# Patient Record
Sex: FEMALE | URBAN - METROPOLITAN AREA
[De-identification: names, ages, dates, MRNs, and addresses within clinical notes are randomized per-mention and may not be internally consistent; named-entity substitution may affect disease eponyms.]

---

## 2024-01-04 ENCOUNTER — OFFICE VISIT (OUTPATIENT)
Dept: PRIMARY CARE | Facility: CLINIC | Age: 34
End: 2024-01-04
Payer: COMMERCIAL

## 2024-01-04 VITALS
SYSTOLIC BLOOD PRESSURE: 117 MMHG | DIASTOLIC BLOOD PRESSURE: 87 MMHG | HEART RATE: 86 BPM | HEIGHT: 63 IN | OXYGEN SATURATION: 97 % | BODY MASS INDEX: 33.13 KG/M2 | WEIGHT: 187 LBS

## 2024-01-04 DIAGNOSIS — Z13.220 SCREENING FOR HYPERLIPIDEMIA: ICD-10-CM

## 2024-01-04 DIAGNOSIS — Z00.00 WELL ADULT EXAM: Primary | ICD-10-CM

## 2024-01-04 DIAGNOSIS — Z13.1 SCREENING FOR DIABETES MELLITUS: ICD-10-CM

## 2024-01-04 DIAGNOSIS — K64.9 HEMORRHOIDS, UNSPECIFIED HEMORRHOID TYPE: ICD-10-CM

## 2024-01-04 PROCEDURE — 1036F TOBACCO NON-USER: CPT | Performed by: FAMILY MEDICINE

## 2024-01-04 PROCEDURE — 99385 PREV VISIT NEW AGE 18-39: CPT | Performed by: FAMILY MEDICINE

## 2024-01-04 ASSESSMENT — PATIENT HEALTH QUESTIONNAIRE - PHQ9
1. LITTLE INTEREST OR PLEASURE IN DOING THINGS: NOT AT ALL
2. FEELING DOWN, DEPRESSED OR HOPELESS: NOT AT ALL
SUM OF ALL RESPONSES TO PHQ9 QUESTIONS 1 AND 2: 0

## 2024-01-04 NOTE — PROGRESS NOTES
"  Subjective   Patient ID: Jil East is a 33 y.o. female who presents for Annual Exam (New patient physical, no concerns today/Last pcp over 2 years ago in [place]).    Past Medical, Surgical, Social and Family Hx reviewed-Yes    Any acute complaints?    She has been wondering if she has hemorrhoids.  She has itching and some swelling but no bleeding.  No pain.  BMs daily, denies constipation    Any chronic issues addressed today or Rx refills needed?    No    Last pap about 4-5 years ago  Last Mammogram N/A  Colon CA screening Hx N/A  Labs never that she knows of  Up to date on immunizations Yes but unsure about TDaP  Dentist in the past year yes    Menstruation no concerns  Sexual Activity no concerns        PE:  /87   Pulse 86   Ht 1.594 m (5' 2.75\")   Wt 84.8 kg (187 lb)   LMP 12/23/2023   SpO2 97%   BMI 33.39 kg/m²   Alert adult woman, NAD  HEENT clear  Neck supple, No LAD  Heart RRR no murmur  Lungs CTA bilat  Abdomen benign, normal BS, soft NT/ND  Skin warm, dry, clear  Neuro grossly normal, gait WNL  Affect pleasant and appropriate, memory and judgement WNL        Assessment/Plan   Problem List Items Addressed This Visit    None  Visit Diagnoses         Codes    Well adult exam    -  Primary Z00.00    Screening for hyperlipidemia     Z13.220    Relevant Orders    Lipid Panel    Screening for diabetes mellitus     Z13.1    Relevant Orders    Hemoglobin A1C    Hemorrhoids, unspecified hemorrhoid type     K64.9          She is having very minimal hemorrhoid symptoms but ongoing for almost a year.  I suggest avoidance of toilet paper, use of warm water for cleansing after bowel movements, squatty potty, stool softener, and over-the-counter Preparation H for couple weeks.  If is not effective she should let me know so we can try other treatment options.    Reviewed health maintenance and vaccines.       "

## 2024-01-22 ENCOUNTER — APPOINTMENT (OUTPATIENT)
Dept: PRIMARY CARE | Facility: CLINIC | Age: 34
End: 2024-01-22
Payer: COMMERCIAL

## 2024-02-26 ENCOUNTER — LAB (OUTPATIENT)
Dept: LAB | Facility: LAB | Age: 34
End: 2024-02-26
Payer: COMMERCIAL

## 2024-02-26 DIAGNOSIS — Z13.220 SCREENING FOR HYPERLIPIDEMIA: ICD-10-CM

## 2024-02-26 DIAGNOSIS — Z13.1 SCREENING FOR DIABETES MELLITUS: ICD-10-CM

## 2024-02-26 LAB
CHOLEST SERPL-MCNC: 148 MG/DL (ref 0–199)
CHOLESTEROL/HDL RATIO: 2.8
EST. AVERAGE GLUCOSE BLD GHB EST-MCNC: 94 MG/DL
HBA1C MFR BLD: 4.9 %
HDLC SERPL-MCNC: 52.6 MG/DL
LDLC SERPL CALC-MCNC: 78 MG/DL
NON HDL CHOLESTEROL: 95 MG/DL (ref 0–149)
TRIGL SERPL-MCNC: 85 MG/DL (ref 0–149)
VLDL: 17 MG/DL (ref 0–40)

## 2024-02-26 PROCEDURE — 83036 HEMOGLOBIN GLYCOSYLATED A1C: CPT

## 2024-02-26 PROCEDURE — 80061 LIPID PANEL: CPT

## 2024-02-26 PROCEDURE — 36415 COLL VENOUS BLD VENIPUNCTURE: CPT

## 2024-05-09 ENCOUNTER — APPOINTMENT (OUTPATIENT)
Dept: DERMATOLOGY | Facility: CLINIC | Age: 34
End: 2024-05-09
Payer: COMMERCIAL

## 2024-07-01 ENCOUNTER — APPOINTMENT (OUTPATIENT)
Dept: OBSTETRICS AND GYNECOLOGY | Facility: CLINIC | Age: 34
End: 2024-07-01
Payer: COMMERCIAL

## 2024-07-01 VITALS
BODY MASS INDEX: 33.06 KG/M2 | HEIGHT: 63 IN | DIASTOLIC BLOOD PRESSURE: 72 MMHG | WEIGHT: 186.6 LBS | SYSTOLIC BLOOD PRESSURE: 120 MMHG

## 2024-07-01 DIAGNOSIS — Z12.4 CERVICAL CANCER SCREENING: ICD-10-CM

## 2024-07-01 DIAGNOSIS — Z31.41 FERTILITY TESTING: Primary | ICD-10-CM

## 2024-07-01 DIAGNOSIS — Z01.419 WELL WOMAN EXAM: ICD-10-CM

## 2024-07-01 PROCEDURE — 88175 CYTOPATH C/V AUTO FLUID REDO: CPT

## 2024-07-01 PROCEDURE — 87624 HPV HI-RISK TYP POOLED RSLT: CPT

## 2024-07-01 PROCEDURE — 1036F TOBACCO NON-USER: CPT | Performed by: OBSTETRICS & GYNECOLOGY

## 2024-07-01 PROCEDURE — 99385 PREV VISIT NEW AGE 18-39: CPT | Performed by: OBSTETRICS & GYNECOLOGY

## 2024-07-01 ASSESSMENT — ENCOUNTER SYMPTOMS
GASTROINTESTINAL NEGATIVE: 1
RESPIRATORY NEGATIVE: 1
NEUROLOGICAL NEGATIVE: 1
CONSTITUTIONAL NEGATIVE: 1
CARDIOVASCULAR NEGATIVE: 1
MUSCULOSKELETAL NEGATIVE: 1
PSYCHIATRIC NEGATIVE: 1

## 2024-07-01 NOTE — PROGRESS NOTES
"Lynn East is a 34 y.o. female who is here for a routine exam. Periods are regular every 25 days, lasting 5 days. Dysmenorrhea:none. Cyclic symptoms include none. No intermenstrual bleeding, spotting, or discharge. Denies dyspareunia.  She and her wife are interested in starting to try to conceive.    Current contraception:  same sex relationship  History of abnormal Pap smear: no  Family history of uterine or ovarian cancer: no  Regular self breast exam: yes  History of abnormal mammogram: no  Family history of breast cancer: no  History of abnormal lipids: no  Menstrual History:  OB History          0    Para   0    Term   0       0    AB   0    Living   0         SAB   0    IAB   0    Ectopic   0    Multiple   0    Live Births   0                  Patient's last menstrual period was 2024 (exact date).         Review of Systems   Constitutional: Negative.    Respiratory: Negative.     Cardiovascular: Negative.    Gastrointestinal: Negative.    Genitourinary: Negative.    Musculoskeletal: Negative.    Neurological: Negative.    Psychiatric/Behavioral: Negative.         Objective   /72   Ht 1.588 m (5' 2.5\")   Wt 84.6 kg (186 lb 9.6 oz)   LMP 2024 (Exact Date)   BMI 33.59 kg/m²     General:   alert, appears stated age, and cooperative   Heart: regular rate and rhythm, S1, S2 normal, no murmur, click, rub or gallop   Lungs: clear to auscultation bilaterally   Abdomen: soft, non-tender, without masses or organomegaly   Pelvic: Vulva normal in appearance without discoloration, rashes, lesions. Vagina is negative for blood, discharge, lesions. Uterus is small, mobile, non tender. There is no cervical motion tenderness, adnexal masses/tenderness   Breast: No masses, skin changes, discoloration, tenderness, or nipple drainage bilaterally     Neck: Normal ROM. Thyroid normal size. No masses/tenderness     Lymph: No LAD   Psych: Normal mood/affect     Assessment/Plan "   Problem List Items Addressed This Visit    None  Visit Diagnoses       Well woman exam    -  Primary    Cervical cancer screening        Relevant Orders    THINPREP PAP          1. Pelvic/breast exam wnl, counseled on breast awareness/self exam  2. Pap/cotest done.  3. Discussed plans for trying to conceive. [facility] baseline labs and referral given today. Discussed HSG prior to [facility] visit if planning IUI    RTO 1 year  Maryjane Heredia, DO

## 2024-07-01 NOTE — PATIENT INSTRUCTIONS
Routine Gynecologic Visit:  You were seen today for a routine gyn visit with normal findings on exam  You were due for a pap smear today. You should still continue to get annual breast and pelvic exams in the office.  Continue self breast exams/monitoring at home and call for appointment in the office if there are ever masses, skin discoloration, dimpling/puckering of the skin, or nipple drainage when you are not pregnant  We discussed plans for conception. Start prenatal vitamins prior to starting to try and conceive. Fertility labs and a referral to [facility] are ordered in the system.  If you are having any gynecologic issues in the next year you should call the office. [phone] ([place]) or [phone] ([place])

## 2024-11-26 ENCOUNTER — PATIENT MESSAGE (OUTPATIENT)
Dept: ENDOCRINOLOGY | Facility: CLINIC | Age: 34
End: 2024-11-26
Payer: COMMERCIAL

## 2024-11-26 ASSESSMENT — LIFESTYLE VARIABLES
HISTORY_ALCOHOL_USE: NO
HISTORY_ALCOHOL_USE: NO
TOBACCO_USE: NO
TOBACCO_USE: NO

## 2024-11-27 ENCOUNTER — CONSULT (OUTPATIENT)
Dept: ENDOCRINOLOGY | Facility: CLINIC | Age: 34
End: 2024-11-27
Payer: COMMERCIAL

## 2024-11-27 ENCOUNTER — ANCILLARY PROCEDURE (OUTPATIENT)
Dept: ENDOCRINOLOGY | Facility: CLINIC | Age: 34
End: 2024-11-27
Payer: COMMERCIAL

## 2024-11-27 VITALS
HEIGHT: 62 IN | SYSTOLIC BLOOD PRESSURE: 135 MMHG | DIASTOLIC BLOOD PRESSURE: 84 MMHG | WEIGHT: 192.38 LBS | HEART RATE: 78 BPM | BODY MASS INDEX: 35.4 KG/M2

## 2024-11-27 DIAGNOSIS — Z01.83 ENCOUNTER FOR RH BLOOD TYPING: ICD-10-CM

## 2024-11-27 DIAGNOSIS — Z11.59 ENCOUNTER FOR SCREENING FOR OTHER VIRAL DISEASES: ICD-10-CM

## 2024-11-27 DIAGNOSIS — Z01.812 ENCOUNTER FOR PREPROCEDURAL LABORATORY EXAMINATION: ICD-10-CM

## 2024-11-27 DIAGNOSIS — Z11.59 ENCOUNTER FOR SCREENING FOR OTHER VIRAL DISEASES: Primary | ICD-10-CM

## 2024-11-27 DIAGNOSIS — Z13.29 SCREENING FOR THYROID DISORDER: ICD-10-CM

## 2024-11-27 DIAGNOSIS — Z11.3 SCREENING FOR STDS (SEXUALLY TRANSMITTED DISEASES): ICD-10-CM

## 2024-11-27 DIAGNOSIS — Z31.41 FERTILITY TESTING: ICD-10-CM

## 2024-11-27 LAB
ABO GROUP (TYPE) IN BLOOD: NORMAL
ANTIBODY SCREEN: NORMAL
CMV IGG AVIDITY SERPL IA-RTO: NONREACTIVE %
HBV SURFACE AG SERPL QL IA: NONREACTIVE
HCV AB SER QL: NONREACTIVE
HIV 1+2 AB+HIV1 P24 AG SERPL QL IA: NONREACTIVE
RH FACTOR (ANTIGEN D): NORMAL
RUBV IGG SERPL IA-ACNC: 1.3 IA
RUBV IGG SERPL QL IA: POSITIVE
TREPONEMA PALLIDUM IGG+IGM AB [PRESENCE] IN SERUM OR PLASMA BY IMMUNOASSAY: NONREACTIVE
TSH SERPL-ACNC: 3.25 MIU/L (ref 0.44–3.98)
VARICELLA ZOSTER IGG INDEX: 2.8 IA
VZV IGG SER QL IA: POSITIVE

## 2024-11-27 PROCEDURE — 86317 IMMUNOASSAY INFECTIOUS AGENT: CPT

## 2024-11-27 PROCEDURE — 86644 CMV ANTIBODY: CPT

## 2024-11-27 PROCEDURE — 87389 HIV-1 AG W/HIV-1&-2 AB AG IA: CPT

## 2024-11-27 PROCEDURE — 87340 HEPATITIS B SURFACE AG IA: CPT

## 2024-11-27 PROCEDURE — 86850 RBC ANTIBODY SCREEN: CPT

## 2024-11-27 PROCEDURE — 86787 VARICELLA-ZOSTER ANTIBODY: CPT

## 2024-11-27 PROCEDURE — 86803 HEPATITIS C AB TEST: CPT

## 2024-11-27 PROCEDURE — 84443 ASSAY THYROID STIM HORMONE: CPT

## 2024-11-27 PROCEDURE — 87591 N.GONORRHOEAE DNA AMP PROB: CPT

## 2024-11-27 PROCEDURE — 86645 CMV ANTIBODY IGM: CPT

## 2024-11-27 PROCEDURE — 83516 IMMUNOASSAY NONANTIBODY: CPT

## 2024-11-27 PROCEDURE — 87491 CHLMYD TRACH DNA AMP PROBE: CPT

## 2024-11-27 PROCEDURE — 99204 OFFICE O/P NEW MOD 45 MIN: CPT | Performed by: NURSE PRACTITIONER

## 2024-11-27 PROCEDURE — 86780 TREPONEMA PALLIDUM: CPT

## 2024-11-27 PROCEDURE — 86900 BLOOD TYPING SEROLOGIC ABO: CPT

## 2024-11-27 PROCEDURE — 86901 BLOOD TYPING SEROLOGIC RH(D): CPT

## 2024-11-27 PROCEDURE — 99214 OFFICE O/P EST MOD 30 MIN: CPT | Performed by: NURSE PRACTITIONER

## 2024-11-27 ASSESSMENT — PATIENT HEALTH QUESTIONNAIRE - PHQ9
SUM OF ALL RESPONSES TO PHQ9 QUESTIONS 1 AND 2: 0
2. FEELING DOWN, DEPRESSED OR HOPELESS: NOT AT ALL
1. LITTLE INTEREST OR PLEASURE IN DOING THINGS: NOT AT ALL

## 2024-11-27 ASSESSMENT — COLUMBIA-SUICIDE SEVERITY RATING SCALE - C-SSRS
1. IN THE PAST MONTH, HAVE YOU WISHED YOU WERE DEAD OR WISHED YOU COULD GO TO SLEEP AND NOT WAKE UP?: NO
2. HAVE YOU ACTUALLY HAD ANY THOUGHTS OF KILLING YOURSELF?: NO
6. HAVE YOU EVER DONE ANYTHING, STARTED TO DO ANYTHING, OR PREPARED TO DO ANYTHING TO END YOUR LIFE?: NO

## 2024-11-27 ASSESSMENT — PAIN SCALES - GENERAL: PAINLEVEL_OUTOF10: 0-NO PAIN

## 2024-11-27 NOTE — PROGRESS NOTES
Visit Type: In Person    NEW FERTILITY PATIENT VISIT    Referred by: Dr. Maryjane Heredia    Accompanied today by: Valerie (spouse)       Jil East is a 34 y.o.  female who presents with   Same sex couple fertility    They are wanting to conceive 1 child with donor sperm. Wanting to do an anonymous donor and try IUI first.     Have you had any concerns about your fertility treatments so far? No     What are you goals for today's visit? Beginning fertility journey, same sex couple     What causes of infertility have been identified on your workup so far? N/A     Past Infertility Treatments: No      Please summarize your fertility treatments to date.   No    As far as you are aware, do you have insurance coverage for fertility diagnostic testing and/or fertility treatments? Yes      PRIOR EVALUATION / TREATMENT  No  Hysterosalpingogram: NA  Saline Infused Sonography: NA  GYN Pelvic Ultrasound: NA  Other:  NA  Prior Labs  Lab Results    Date Done      AMH: No results found for requested labs within last 1825 days. No results found for requested labs within last 1825 days.   TSH: No results found for requested labs within last 1825 days. No results found for requested labs within last 1825 days.   PRL: No results found for requested labs within last 1825 days. No results found for requested labs within last 1825 days.   Testosterone: No results found for requested labs within last 1825 days. No results found for requested labs within last 1825 days.   DHEAS: No results found for requested labs within last 1825 days. No results found for requested labs within last 1825 days.   FSH: No results found for requested labs within last 1825 days. No results found for requested labs within last 1825 days.   17 OHP: No results found for requested labs within last 1825 days. No results found for requested labs within last 1825 days.   HgbA1c: 4.9 (Ref range: see below %) 2024   Hepatitis B surface antigen: No  results found for requested labs within last 1825 days. No results found for requested labs within last 1825 days.   Hepatitis C antibody: No results found for requested labs within last 1825 days. No results found for requested labs within last 1825 days.   HIV ½ Antigen Antibody screen with reflex: No results found for requested labs within last 1825 days. No results found for requested labs within last 1825 days.   Syphilis screening with reflex: No results found for requested labs within last 1825 days. No results found for requested labs within last 1825 days.   GC: No results found for requested labs within last 1825 days. No results found for requested labs within last 1825 days.   CT: No results found for requested labs within last 1825 days. No results found for requested labs within last 1825 days.   Type and Screen: No results found for requested labs within last 1825 days. No results found for requested labs within last 1825 days.   Rh: No results found for requested labs within last 1825 days. No results found for requested labs within last 1825 days.   Antibody: No results found for requested labs within last 1825 days. No results found for requested labs within last 1825 days.   Rubella: No results found for requested labs within last 1825 days. No results found for requested labs within last 1825 days.   Varicella: No results found for requested labs within last 1825 days. No results found for requested labs within last 1825 days.   Hemoglobin: No results found for requested labs within last 1825 days. No results found for requested labs within last 1825 days.   Hematocrit: No results found for requested labs within last 1825 days. No results found for requested labs within last 1825 days.   Creatinine: No results found for requested labs within last 1825 days. No results found for requested labs within last 1825 days.   AST:No results found for requested labs within last 1825 days. No results found  for requested labs within last 1825 days.   ALT:No results found for requested labs within last 1825 days.: No results found for requested labs within last 1825 days.   Relationship Status:       Have you ever been pregnant? No    How many times have you been pregnant?  0  Have you ever had a miscarriage? No    How many times have you had a miscarriage?  0     OB Hx     OB History          0    Para   0    Term   0       0    AB   0    Living   0         SAB   0    IAB   0    Ectopic   0    Multiple   0    Live Births   0                 GYN HISTORY   Have you ever been diagnosed with a sexually transmitted disease? No    Please select all that are applicable:    Have you ever had Pelvic Inflammatory Disease? No    Have you had an abnormal PAP smear? No    Date & Result of last PAP smear:   Lab Results   Component Value Date    FINALINTERP  2024         A. THINPREP PAP CERVIX, SCREENING -     Specimen Adequacy  Satisfactory for evaluation; endocervical/transformation zone component is present    General Categorization  Negative for intraepithelial lesion or malignancy.    Descriptive Interpretation  Negative for intraepithelial lesion or malignancy              Have you ever had an abnormal Mammogram? No    Date & result of your last mammogram:  NA  Do you have pelvic pain? No    How many times per week do you have intercourse?    Do you have pain with intercourse? No    Do you use lubricants with intercourse?    Do you have pain with bowel movements? No              Do you have pain with a full bladder? No    MENSTRUAL HISTORY  LMP:    Menarche: 2024    Contraception: N/A    Cycle length: 30    Describe your bleeding: Average    Dysmenorrhea: No       ENDOCRINE/INFERTILITY HISTORY  Duration of infertility: Not applicable    Coital Activity/week:    Nipple Discharge: No    Vision changes: No    Headaches: No    Excess hair growth: No    Excessive hair loss: No    Acne: No    Oily  skin: No    Recent weight change  Weight gain: No    Weight loss: No    Exercise more than 3 times a week: Yes      PMH  Past Medical History:   Diagnosis Date    No pertinent past medical history         MEDICATIONS  No current outpatient medications on file prior to visit.     No current facility-administered medications on file prior to visit.        PSH  Past Surgical History:   Procedure Laterality Date    APPENDECTOMY      TONSILLECTOMY          PSYCH HISTORY  Have you ever been diagnosed with a mental health Issue? No    Have you ever been hospitalized for a mental health disorder? No       SOCIAL HISTORY  Social History     Tobacco Use    Smoking status: Never     Passive exposure: Never    Smokeless tobacco: Never   Vaping Use    Vaping status: Never Used   Substance Use Topics    Alcohol use: Not Currently     Alcohol/week: 1.0 standard drink of alcohol     Types: 1 Standard drinks or equivalent per week    Drug use: Not Currently     Occupation:  at [employer]    Have you ever been incarcerated? No    Do you have a history of domestic violence? No    Do you feel safe at home? Yes    Do you have a history of any negative sexual experience such as incest or rape? No       PARTNER HISTORY  Partner Name: Valerie Quirozulysses    Partner : [id number]    Partner email: [email or web address]    Occupation: Ticketing Director at [employer]    Prior fertility history:    PMH:    PSH:    Smoking:No    Alcohol Use: No    Drug Use: No    Medications:    Injuries: No    STD: No    Please select all that are applicable:    SA: No    SA Results:      FAMILY HISTORY  Family History   Problem Relation Name Age of Onset    No Known Problems Mother      Other (htn) Father      Stroke Father      Prostate cancer Father  80    No Known Problems Sister         CANCER HISTORY    Breast: No    Ovarian: No    Colon: No    Endometrial: No      FAMILY VTE HISTORY  Family History of Blood Clots: No      GENETIC  "HISTORY  Ethnic Background  Patient:     Partner:     Genetic Disease in Family  Patient: No    Partner: No    Birth Defects in Family  Patient: No    Partner: No    Genetic screening performed previously:   None     BMI:   BMI Readings from Last 1 Encounters:   24 35.19 kg/m²     VITALS:  /84   Pulse 78   Ht 1.575 m (5' 2\")   Wt 87.3 kg (192 lb 6 oz)   LMP 2024   BMI 35.19 kg/m²     ASSESSMENT   34 y.o.  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  Partner SA: NA    COUNSELING  We discussed causes of infertility including hormonal, egg quality issues, structural problems such as endometriosis, adhesions, or tubal problems, uterine factors such as polyps or fibroids, and sperm issues. Reviewed evaluation of such as well. We discussed various methods for achieving pregnancy in some detail including, ovulation induction, insemination, superovulation and IVF.    We discussed the options for donor sperm, including using a directed vs. unidentified donor; we discussed the pros and cons of each option.  We discussed that for unidentified donors that offspring may be able to identify the donor and/or other offspring from the same donor in the future. We discussed the costs and logistics of obtaining the sperm and the use of a sperm bank.  We discussed options for conceiving including natural cycle IUI, medicated IUI, and IVF.  We discussed natural fecundity and the need for fertility testing.  We discussed the Fertility Center policy to obtain donor clearance prior to ordering and shipping sperm. Donor sperm must be on site prior to treatment cycle start.    DONOR SPERM FACILITIES*     For your convenience, below is a list of commonly used donor sperm facilities by patients at  Fertility Center.   The facilities listed below adhere to CLIA guidelines and are FDA approved for .     [place] " [facility] ([facility])   [facility] ([facility])   [facility]   [facility]   [facility]   [facility]   [facility]   [facility]     If the facility you wish to use is not listed, you may request a review by the  and third party manager. Approval is not guaranteed and is granted on a case-by-case basis.   The donor of your choice will be reviewed by our third party team. Upon approval, please confirm with your clinical team before shipping. Samples and/or donors that have not been approved may not be accepted.        * Please note that [facility] has no affiliation or agency relationship with any of the donor facilities listed above, and it disclaims any and all responsibility for tissue or other property transferred from such storage facilities.     INSTRUCTIONS FOR INFERTILITY TESTING    Semen Analysis  Call cd 1 to schedule both HSG and pelvic ult      Hysterosalpingogram (HSG or x-ray dye test)  An HSG is a test used to make sure your fallopian tubes are not blocked.  This test can only be done between cycle days 5-11, so it is important for you to call as soon as your period starts to schedule an appointment.  You should take ibuprofen 30 minutes before your appointment as this test can cause transient cramping.      If you are allergic to iodine or shellfish, please inform the . Please call [phone] to schedule this appointment.      PELVIC ULTRASOUNDS  Please call [phone] to schedule this appointment.      Continue to take a prenatal vitamin daily (800mcg or 0.8mg folic acid)    Please reach out to the office for a plan once all the testing is complete- I am not notified when everything on this list is complete, so please call the office directly to check in. The office number is [phone].    If you are interested in integrative medicine to support your care, please contact   "[facility] at [phone]. Services available include acupuncture, integrative medicine consultations, massage, meditation and stress management. For acupuncture specifically, please ask for Suzy Segovia (east side) or Ivonne Campos (west side).     We also offer a virtual support group every Monday from 6pm-7pm. For more info please email:    For more information and to RSVP, email [email or web address]       If you have any questions and wish to review with a member of our team, please do CALL THE OFFICE during business hours. The office number is [phone].     'MONITORED CYCLE  You will take your oral fertility medications as prescribed cycle days 3-7. When you start the medications, you can also call the office to get set up for a \"follicle scan.\" Follicle scans are usually done between cycle days 10-12. It is a vaginal ultrasound. We typically do not perform follicle scans on the weekends, so the schedulers will help you find an appropriate appointment time. The follicle scans are done in the morning and will give us information on how your eggs are growing and how thick the lining of the uterus is. Once the follicles (eggs) are big enough (ripe or mature) we will trigger their release with a one time subcutaneous injection- the nurses will order this for you and show you how to do it when you are here for the follicle scan. We cannot trigger small eggs, only ones that have grown to mature size. The IUI is usually done about 36 hours after the trigger. The nurse will also help you schedule that.    Routine Testing  Fertility Center  STDs Within 1 year   Genetic carrier Waiver/Completed   T&S Within 1 year   AMH Within 1 year   TSH Within 1 year   Rubella/Varicella Within 5 years     PLAN  Orders Placed This Encounter   Procedures    Hysterosalpingogram (HSG)    US pelvis transvaginal    FL hysterosalpingogram    Cytomegalovirus Antibody, IgM    Cytomegalovirus " Antibody, Igg    Antimullerian Hormone (Amh)    TSH with reflex to Free T4 if abnormal    Type And Screen    Rubella Antibody, Igg    Varicella Zoster Antibody, Igg    Hepatitis B surface antigen    Hepatitis C Antibody    HIV 1/2 Antigen/Antibody Screen with Reflex to Confirmation    Syphilis Screen with Reflex    C. trachomatis / N. gonorrhoeae, Amplified    POCT pregnancy, urine manually resulted       GENETIC SCREENING PATIENT  NA    PARTNER  NA  NA    FOLLOW UP   Consults: Psych consult: [email or web address] party- [phone] , genetics- 3rd party- [phone]  Chart to primary nurse for care coordination and patient check list/education  Enroll in Engaged MD  Take prenatal vitamins, vitamin D 2000 IUs daily  Discussed that pap and mammogram must be updated per ACOG guidelines before treatment can begin  Discussed that treatment cannot proceed until checklist items are complete   6 week follow up with [facility]  Additional testing for BMI < 18 or > 40: No      MD Completion:  Ectopic Risk: No  Medically Complex: No    Fertility Plan Update:  Natural/monitored/trigger/donor IUIx3    Jessica Mayfield  11/27/2024  10:10 AM

## 2024-11-28 LAB
C TRACH RRNA SPEC QL NAA+PROBE: NEGATIVE
N GONORRHOEA DNA SPEC QL PROBE+SIG AMP: NEGATIVE

## 2024-11-30 LAB — CMV IGM SERPL-ACNC: <8 AU/ML

## 2024-12-01 LAB — MIS SERPL-MCNC: 3.18 NG/ML (ref 0.18–11.71)

## 2025-01-17 ENCOUNTER — ANCILLARY PROCEDURE (OUTPATIENT)
Dept: ENDOCRINOLOGY | Facility: CLINIC | Age: 35
End: 2025-01-17
Payer: COMMERCIAL

## 2025-01-17 DIAGNOSIS — Z31.41 FERTILITY TESTING: ICD-10-CM

## 2025-01-17 PROCEDURE — 76830 TRANSVAGINAL US NON-OB: CPT | Performed by: STUDENT IN AN ORGANIZED HEALTH CARE EDUCATION/TRAINING PROGRAM

## 2025-01-17 PROCEDURE — 76830 TRANSVAGINAL US NON-OB: CPT

## 2025-02-05 ENCOUNTER — HOSPITAL ENCOUNTER (OUTPATIENT)
Dept: RADIOLOGY | Facility: HOSPITAL | Age: 35
Discharge: HOME | End: 2025-02-05
Payer: COMMERCIAL

## 2025-02-05 ENCOUNTER — ANCILLARY PROCEDURE (OUTPATIENT)
Dept: ENDOCRINOLOGY | Facility: CLINIC | Age: 35
End: 2025-02-05
Payer: COMMERCIAL

## 2025-02-05 DIAGNOSIS — Z01.812 ENCOUNTER FOR PREPROCEDURAL LABORATORY EXAMINATION: ICD-10-CM

## 2025-02-05 DIAGNOSIS — Z31.41 FERTILITY TESTING: ICD-10-CM

## 2025-02-05 LAB — PREGNANCY TEST URINE, POC: NEGATIVE

## 2025-02-05 PROCEDURE — 2550000001 HC RX 255 CONTRASTS: Performed by: NURSE PRACTITIONER

## 2025-02-05 PROCEDURE — 58340 CATHETER FOR HYSTEROGRAPHY: CPT

## 2025-02-05 PROCEDURE — 58340 CATHETER FOR HYSTEROGRAPHY: CPT | Performed by: NURSE PRACTITIONER

## 2025-02-05 RX ADMIN — IOHEXOL 20 ML: 240 INJECTION, SOLUTION INTRATHECAL; INTRAVASCULAR; INTRAVENOUS; ORAL at 08:37

## 2025-02-05 NOTE — PROCEDURES
Hysterosalpingogram (HSG)    Date/Time: 2/5/2025 8:38 AM    Performed by: SUNIL Quiros  Authorized by: SUNIL Quiros    Consent:     Consent obtained:  Written and verbal    Consent given by:  Patient    Risks, benefits, and alternatives were discussed: yes      Risks discussed:  Bleeding, infection and pain    Alternatives discussed:  No treatment  Universal protocol:     Procedure explained and questions answered to patient or proxy's satisfaction: yes      Relevant documents present and verified: yes      Test results available: yes      Imaging studies available: yes      Required blood products, implants, devices, and special equipment available: yes      Site/side marked: no      Immediately prior to procedure, a time out was called: yes      Patient identity confirmed:  Verbally with patient, arm band and hospital-assigned identification number  Indications:     Indications:  Fertility testing  Pre-procedure details:     Skin preparation:  Povidone-iodine  Sedation:     Sedation type:  None  Anesthesia:     Anesthesia method:  None  Procedure specific details:      Done by this [facility]      Hysterosalpingogram (HSG) risks, benefits, alternatives, and personnel discussed with patient who agreed to proceed.    Procedural time out        Done in room where procedure done: Yes        Done just before starting procedure: Yes                                                 All members of procedural team involved in time-out: Yes                  Active communication used: Yes                                                         All team members agreed on procedure: Yes                                        Patient correctly identified by two identifiers: Yes                                  Correct side and site identified: Yes                                                     All needed special equipment/instruments available: Yes               Prior to the start of the procedure a time out  was taken and the following were verified: The identity of the patient using two patient identifiers.   Urine pregnancy test was performed and was negative.   Risks, benefits, and alternatives of the procedure were explained to the patient.  Informed consent was obtained.     The patient was placed in the dorsal lithotomy position and a sterile speculum was placed in the vagina. The cervix was sterilized with Betadine x 3. The anterior lip of the cervix was grasped with a single-tooth tenaculum. The (balloon/acorn) cannula was then placed in the cervix and secured to the tenaculum. The patient was positioned and images were taken with fluoroscopy as dye was inserted through the cannula. All instruments were then removed. The patient tolerated the procedure well and was discharged home the same day without complications.    Uterus:  normal contour without filling defects  Tubes:  bilateral patency with free spill of dye, normal tubal architecture noted, and no loculations present.  Based on these findings, my recommendation is: No further follow up required.    The patient was counseled regarding the above findings and images were reviewed with patient at the time of the exam.     Jessica Mayfield 02/05/25 8:38 AM        Post-procedure details:     Procedure completion:  Tolerated well, no immediate complications

## 2025-02-07 ENCOUNTER — APPOINTMENT (OUTPATIENT)
Dept: GENETICS | Facility: CLINIC | Age: 35
End: 2025-02-07
Payer: COMMERCIAL

## 2025-02-07 VITALS
SYSTOLIC BLOOD PRESSURE: 126 MMHG | HEIGHT: 62 IN | BODY MASS INDEX: 35.7 KG/M2 | HEART RATE: 64 BPM | DIASTOLIC BLOOD PRESSURE: 86 MMHG | WEIGHT: 194 LBS

## 2025-02-07 DIAGNOSIS — Z31.430 ENCOUNTER OF FEMALE FOR TESTING FOR GENETIC DISEASE CARRIER STATUS FOR PROCREATIVE MANAGEMENT: ICD-10-CM

## 2025-02-07 DIAGNOSIS — Z31.69 ENCOUNTER FOR PRECONCEPTION CONSULTATION: ICD-10-CM

## 2025-02-07 PROCEDURE — 96041 GENETIC COUNSELING SVC EA 30: CPT | Performed by: GENETIC COUNSELOR, MS

## 2025-02-07 ASSESSMENT — PAIN SCALES - GENERAL: PAINLEVEL_OUTOF10: 0-NO PAIN

## 2025-02-07 NOTE — PROGRESS NOTES
Thank you for the referral of Jil East.  She is a 34 year old, G0, female seen for preconception genetic counseling with her wife, Valerie, to discuss carrier screening in the context of selecting a sperm donor.         PAST HISTORY:  Jil and Valerie are interested in conception; Jil would like to undergo intrauterine insemination with donor sperm. The couple does not intend to have more than one child, and do not plan on future conceptions utilizing Valerie's oocytes.     FAMILY HISTORY  Medical and family histories were reviewed and no significant concerns reported.   The family history was negative for birth defects, intellectual disability, recurrent pregnancy loss, or recognized inherited conditions.  Consanguinity denied.          SELF REPORTED RACE/ANCESTRY:  Patient: Divehi, Bel[place]an, Portuguese, Northern Irish, Some Ashkenazi Lutheran      COUNSELING:    The following information was discussed with your patient:    1. When utilizing an anonymous donor for conception, intended parents must use an approved bank for donor selection after review of donor profiles. Per [facility] Policy any donor must be approved prior to sperm being purchased and utilized for conception. Included in the donor profile(s) is any genetic screening that the donor may have had. The most common form of screening is expanded carrier screening, in which the donor is screened for a number of autosomal recessive and/or X-linked genetic conditions and determined to be a carrier or unlikely to be a carrier of each disorder tested. The specific carrier testing panel (number and types of diseases included) is variable from donor to donor, even when donors are from the same bank. If both gamete sources are carriers of the same autosomal recessive condition, there would be a 25% chance for each conception to be affected with the disorder. In this event, selecting an alternative donor, or performing preimplantation genetic testing for the disorder of concern  may be considered.     2. We discussed the option of pursuing carrier screening for ACMG recommended conditions (113 disease panel) to screen for the most common diseases (those with a carrier frequency of 1% or more in the general population) with the ability to reflex to additional diseases at no cost if needed. When testing is performed prior to reviewing donor profiles this allows the patient to be aware of any common diseases they may be a carrier of and attempt to select a donor that has had negative screening for said disease(s). In addition, they also have more flexibility in selecting a donor that may be a carrier of a common disease if they are already aware that they have screened negative for said disease.  If a donor is a carrier of a condition that the patient has not been screened for, additional screening can be performed on the patient if desired; however, if the patient is a carrier of a condition that the potential donor has not been screened for, additional screening for the donor cannot be guaranteed. If further testing is not performed, the couple can select an alternate donor, or they can proceed with the selected donor once they have been informed of the potential risk of an affected child in the absence of additional carrier screening. Approximately 2-4% of reproductive couples are found to be at risk to have a child with a genetic disorder based on expanded carrier screening. Carriers generally do not have symptoms. In rare cases, expanded carrier screening results may have health implications for the tested individual.  If the patient does not want to proceed with carrier screening at this time, carrier screening can be declined altogether, or it can be deferred and testing can be performed in the context of screening for any diseases that may be of concern for any potential donor. In addition, more comprehensive carrier screening options are available and were also discussed. If testing  is elected, we also discussed consideration of screening BOTH female partners if they plan on future conceptions utilizing the same sperm donor with each of their oocytes (declined).     3. We discussed that expanded carrier screening panels are typically screening for primarily autosomal recessive conditions, and potentially some X-linked conditions with childhood onset of symptoms. The types of conditions screened for range in severity, clinical symptoms, age of onset, management, and whether treatment is available. In addition, the number of conditions and specific conditions tested for on any given panel is not standardized, and genetic screening ordered for gamete donors is not consistent across bacon. They are often not screened for other types of hereditary disease, such as predisposition to cancers, heart diseases, neurological problems, etc. They sometimes have screening of their chromosomes, which can assist in understanding risks of chromosome conditions in a conception from that donor. Any genetic testing that is or is not completed through a bank can be discussed with a genetic counselor once a donor is selected.       4. If patient proceeds with carrier screening, once results are issued and other recommendations by their CAMILLE provider are completed, CAMILLE team provides clearance to begin searching for a donor. If patient's carrier screening is negative, they would need to ensure that they have screened negative for any disease(s) that a selected donor may have screened positive for. If carrier screening is positive, they would additionally need to review that the donor has screened negative for any disease the patient has tested positive for. We recommend additional genetic counseling appointment to review the patient and donor's test results for clearance.            DISPOSITION:  The patient stated that she understood the above information and elected to proceed with:  -Carrier screening via   disease/113 gene panel; drawn today and sent to [facility]      Total time Carole Vásquez MS, [facility] spent on day of encounter: 56 minutes (45 minutes with patient, 11 minutes on pre/post patient care activities, including documentation).    Thank you for allowing us to participate in the care of your patient.  Should you or your patient have any questions, please do not hesitate to contact our office at [phone].      Sincerely,      Carole Vásquez MS  Licensed Genetic Counselor

## 2025-02-11 ENCOUNTER — TELEMEDICINE (OUTPATIENT)
Dept: ENDOCRINOLOGY | Facility: CLINIC | Age: 35
End: 2025-02-11
Payer: COMMERCIAL

## 2025-02-11 VITALS — WEIGHT: 190 LBS | HEIGHT: 62 IN | BODY MASS INDEX: 34.96 KG/M2

## 2025-02-11 DIAGNOSIS — Z31.89 ENCOUNTER FOR FERTILITY PLANNING: Primary | ICD-10-CM

## 2025-02-11 PROCEDURE — 1036F TOBACCO NON-USER: CPT | Performed by: NURSE PRACTITIONER

## 2025-02-11 PROCEDURE — 3008F BODY MASS INDEX DOCD: CPT | Performed by: NURSE PRACTITIONER

## 2025-02-11 PROCEDURE — 99214 OFFICE O/P EST MOD 30 MIN: CPT | Performed by: NURSE PRACTITIONER

## 2025-02-11 ASSESSMENT — PAIN SCALES - GENERAL: PAINLEVEL_OUTOF10: 0-NO PAIN

## 2025-02-11 ASSESSMENT — PATIENT HEALTH QUESTIONNAIRE - PHQ9
1. LITTLE INTEREST OR PLEASURE IN DOING THINGS: NOT AT ALL
SUM OF ALL RESPONSES TO PHQ9 QUESTIONS 1 AND 2: 0
2. FEELING DOWN, DEPRESSED OR HOPELESS: NOT AT ALL

## 2025-02-11 ASSESSMENT — PROMIS GLOBAL HEALTH SCALE
RATE_QUALITY_OF_LIFE: EXCELLENT
RATE_PHYSICAL_HEALTH: VERY GOOD
RATE_GENERAL_HEALTH: VERY GOOD
RATE_AVERAGE_PAIN: 0
EMOTIONAL_PROBLEMS: NEVER
RATE_SOCIAL_SATISFACTION: EXCELLENT
CARRYOUT_SOCIAL_ACTIVITIES: EXCELLENT
RATE_MENTAL_HEALTH: EXCELLENT
CARRYOUT_PHYSICAL_ACTIVITIES: COMPLETELY

## 2025-02-11 NOTE — PROGRESS NOTES
"  Virtual or Telephone Consent: An interactive audio and video telecommunication system which permits real time communications between the patient (at the originating site) and provider (at the distant site) was utilized to provide this telehealth service  MD reviewed, Authorization status not noted.    Follow Up Visit HPI    Patient is a 34 y.o.  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     Testing to date:   Result Date Done   TSH: 3.25 (Ref range: 0.44 - 3.98 mIU/L) 2024   AMH: 3.179 (Ref range: 0.176 - 11.705 ng/mL) 2024   PRL: No results found for requested labs within last 365 days. No results found for requested labs within last 365 days.   Testosterone: No results found for requested labs within last 365 days. No results found for requested labs within last 365 days.   DHEAS: No results found for requested labs within last 365 days. No results found for requested labs within last 365 days.   Other:     Hysterosalpingogram: FL HYSTEROSALPINGOGRAM (2025):   Normal    Saline Infused Sonography: NA  GYN Pelvic Ultrasound: US PELVIS TRANSVAGINAL (2025):   Normal    Partner SA: NA    Treatment to date:   None    Past Medical History:   Diagnosis Date    No pertinent past medical history      Past Surgical History:   Procedure Laterality Date    APPENDECTOMY  2003    TONSILLECTOMY       No current outpatient medications on file prior to visit.     No current facility-administered medications on file prior to visit.       BMI:   BMI Readings from Last 1 Encounters:   25 34.75 kg/m²     VITALS:  Ht 1.575 m (5' 2\")   Wt 86.2 kg (190 lb)   LMP 2025 (Exact Date)   BMI 34.75 kg/m²   LMP: Patient's last menstrual period was 2025 (exact date).    ASSESSMENT   34 y.o.  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    COUNSELING    MONITORED " "CYCLE  You will take your oral fertility medications as prescribed cycle days 3-7. When you start the medications, you can also call the office to get set up for a \"follicle scan.\" Follicle scans are usually done between cycle days 10-12. It is a vaginal ultrasound. We typically do not perform follicle scans on the weekends, so the schedulers will help you find an appropriate appointment time. The follicle scans are done in the morning and will give us information on how your eggs are growing and how thick the lining of the uterus is. Once the follicles (eggs) are big enough (ripe or mature) we will trigger their release with a one time subcutaneous injection- the nurses will order this for you and show you how to do it when you are here for the follicle scan. We cannot trigger small eggs, only ones that have grown to mature size. The IUI is usually done about 36 hours after the trigger. The nurse will also help you schedule that.      Routine Testing  Fertility Center  STDs Within 1 year   Genetic carrier Waiver/Completed   T&S Within 1 year   AMH Within 1 year   TSH Within 1 year   Rubella/Varicella Within 5 years     BMI Testing  Fertility Center  CBC Within 1 year   CMP Within 1 year   HgbA1c Within 1 year   Mag, Phos, Vit D <18 Within 1 year   MFM > 40  REQ   Wt loss consult > 40 OPT     PLAN  No orders of the defined types were placed in this encounter.      FOLLOW UP   Consults:  continue with psych consult as scheduled on 3/7 and follow up with Yajaira on donor choice after testing done.  Engaged MD  Take prenatal vitamins, vitamin D 2000 IUs daily  Discussed that treatment cannot proceed until checklist items are complete.   Additional testing for BMI < 18 or > 40: No.  Patient to schedule follow up visit if not pregnant after 3 IUI's. Advised patient to arrange this now with the .  Chart to primary nurse for care coordination and patient check list/education.    MD Completion:  Ectopic Risk: " No  Medically Complex: No  Outstanding boarding pass items: psych and donor clearance    Fertility Plan Update:  Natural cycle/monitoring/trigger/donor IUI x 3    Sent message to RN to set up boarding pass- does not need another visit unless patient wants one.    Intimate Exam Performed: No, an intimate exam was not performed at this encounter.     Jessica Mayfield  02/11/2025  3:06 PM

## 2025-02-12 ENCOUNTER — APPOINTMENT (OUTPATIENT)
Dept: PRIMARY CARE | Facility: CLINIC | Age: 35
End: 2025-02-12
Payer: COMMERCIAL

## 2025-02-12 VITALS
BODY MASS INDEX: 35.15 KG/M2 | HEART RATE: 68 BPM | OXYGEN SATURATION: 98 % | WEIGHT: 191 LBS | DIASTOLIC BLOOD PRESSURE: 88 MMHG | SYSTOLIC BLOOD PRESSURE: 130 MMHG | HEIGHT: 62 IN

## 2025-02-12 DIAGNOSIS — Z00.00 WELL ADULT EXAM: Primary | ICD-10-CM

## 2025-02-12 PROCEDURE — 99395 PREV VISIT EST AGE 18-39: CPT | Performed by: FAMILY MEDICINE

## 2025-02-12 PROCEDURE — 3008F BODY MASS INDEX DOCD: CPT | Performed by: FAMILY MEDICINE

## 2025-02-12 PROCEDURE — 1036F TOBACCO NON-USER: CPT | Performed by: FAMILY MEDICINE

## 2025-02-12 PROCEDURE — 90715 TDAP VACCINE 7 YRS/> IM: CPT | Performed by: FAMILY MEDICINE

## 2025-02-12 PROCEDURE — 90471 IMMUNIZATION ADMIN: CPT | Performed by: FAMILY MEDICINE

## 2025-02-12 ASSESSMENT — PATIENT HEALTH QUESTIONNAIRE - PHQ9
SUM OF ALL RESPONSES TO PHQ9 QUESTIONS 1 AND 2: 0
1. LITTLE INTEREST OR PLEASURE IN DOING THINGS: NOT AT ALL
2. FEELING DOWN, DEPRESSED OR HOPELESS: NOT AT ALL

## 2025-02-12 NOTE — PROGRESS NOTES
"  Subjective   Patient ID: Jil East is a 34 y.o. female who presents for Annual Exam (Patient is here for annual physical. She sees GYN for paps. ).      Past Medical, Surgical, Social and Family Hx reviewed-Yes    Any acute complaints?    She is planning to get pregnant this year     Any chronic issues addressed today or Rx refills needed?    none    Last pap 2024 HPV neg  Last Mammogram N/A  Colon CA screening Hx N/A  Labs reviewed  Up to date on immunizations needs TDaP  Dentist in the past year yes    Menstruation regular  Sexual Activity no concerns        PE:  /88   Pulse 68   Ht 1.575 m (5' 2\")   Wt 86.6 kg (191 lb)   LMP 01/29/2025 (Exact Date)   SpO2 98%   BMI 34.93 kg/m²   Alert adult woman, NAD  HEENT clear  Neck supple, No LAD  Heart RRR no murmur  Lungs CTA bilat  Abdomen benign, normal BS, soft NT/ND  Skin warm, dry, clear  Neuro grossly normal, gait WNL  Affect pleasant and appropriate, memory and judgement WNL        Assessment/Plan   Assessment & Plan  Well adult exam  Reviewed HM and vaccines   Reviewed preconception planning               "

## 2025-02-19 ENCOUNTER — DOCUMENTATION (OUTPATIENT)
Dept: ENDOCRINOLOGY | Facility: CLINIC | Age: 35
End: 2025-02-19
Payer: COMMERCIAL

## 2025-02-19 NOTE — PROGRESS NOTES
Sent eTruck message with next steps to patient. Patient needs to sign consent forms, order sperm once approval is done, and complete psych consult. Patient to call or respond back to eTruck with any questions. CAMILLE cycle tab started, standing orders placed, sperm prep order placed. Patient will just need med orders and to be set up with monitoring once she has clearance on her BP and sperm is ordered.   ASIM MORRIS on 2/19/25 at 12:08 PM.

## 2025-02-24 ENCOUNTER — APPOINTMENT (OUTPATIENT)
Dept: BEHAVIORAL HEALTH | Facility: CLINIC | Age: 35
End: 2025-02-24
Payer: COMMERCIAL

## 2025-03-07 ENCOUNTER — APPOINTMENT (OUTPATIENT)
Dept: BEHAVIORAL HEALTH | Facility: CLINIC | Age: 35
End: 2025-03-07
Payer: COMMERCIAL

## 2025-03-07 DIAGNOSIS — F41.8 ANXIETY ABOUT HEALTH: Primary | ICD-10-CM

## 2025-03-07 DIAGNOSIS — Z31.69 INFERTILITY COUNSELING: ICD-10-CM

## 2025-03-07 PROCEDURE — 90791 PSYCH DIAGNOSTIC EVALUATION: CPT | Performed by: PSYCHOLOGIST

## 2025-03-07 NOTE — PROGRESS NOTES
Psychosocial Consultation for Third Party Reproduction    Virtual visit using audio and visual equipment.  POS10  No falls, tobacco use, no SI    On March 7, 2025, I met virtually with Jil East and Valerie Tello who are requesting IUI for Jil using undisclosed donor sperm.  Relevant History  Jil, 34, and Valerie, 35, have been together for 10 years and  for 4.5 years.  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  Jil would like to experience pregnancy and pass on her genetic material.  Valerie has epilepsy and PMDD and does not want to experience pregnancy for health reasons and also states that her family medical history is not great and does not want to pass on her genes.  The couple has not yet chosen a donor but Jil has already had her genetic screening and met with the genetics counselor so they are now ready to start looking for a donor.   We discussed disclosure to offspring and reviewed examples of books for young children with 2 moms and donor sperm.  Both deny any psychiatric history, substance abuse or history of physical or sexual abuse.  Impression: It is my clinical opinion that Jil East and Valerie Tello are able to give informed consent and have carefully considered the psychosocial issues inherent in this third party reproductive option.

## 2025-03-07 NOTE — LETTER
March 7, 2025     Jessica Mayfield, APRN-CNP  [address]  [facility], [place] [address], [address]  [facility] [address]    Patient: Jil East   YOB: 1990   Date of Visit: 3/7/2025       Dear Dr. Jessica Mayfield, APRN-CNP:    Thank you for referring Jil East to me for evaluation. Below are my notes for this consultation.  If you have questions, please do not hesitate to call me. I look forward to following your patient along with you.       Sincerely,     Vee Reyes, PhD      CC: Yolie Tai RN  [facility] [place] [place] [id number] CLINICAL SUPPORT STAFF  ______________________________________________________________________________________    Psychosocial Consultation for Third Party Reproduction    Virtual visit using audio and visual equipment.  POS10  No falls, tobacco use, no SI    On March 7, 2025, I met virtually with Jil Kita and Valerie Tello who are requesting IUI for Jil using undisclosed donor sperm.  Relevant History  Jil, 34, and Valerie, 35, have been together for 10 years and  for 4.5 years.  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  Jil would like to experience pregnancy and pass on her genetic material.  Valerie has epilepsy and PMDD and does not want to experience pregnancy for health reasons and also states that her family medical history is not great and does not want to pass on her genes.  The couple has not yet chosen a donor but Jil has already had her genetic screening and met with the genetics counselor so they are now ready to start looking for a donor.   We discussed disclosure to offspring and reviewed examples of books for young children with 2 moms and donor sperm.  Both deny any psychiatric history, substance abuse or history of physical or sexual abuse.  Impression: It is my clinical opinion that Jil Kita and Valerie Tello are able to  give informed consent and have carefully considered the psychosocial issues inherent in this third party reproductive option.

## 2026-02-12 ENCOUNTER — APPOINTMENT (OUTPATIENT)
Dept: PRIMARY CARE | Facility: CLINIC | Age: 36
End: 2026-02-12
Payer: COMMERCIAL